# Patient Record
Sex: MALE | Race: WHITE | NOT HISPANIC OR LATINO | Employment: FULL TIME | URBAN - METROPOLITAN AREA
[De-identification: names, ages, dates, MRNs, and addresses within clinical notes are randomized per-mention and may not be internally consistent; named-entity substitution may affect disease eponyms.]

---

## 2021-12-01 ENCOUNTER — APPOINTMENT (OUTPATIENT)
Dept: RADIOLOGY | Facility: MEDICAL CENTER | Age: 31
End: 2021-12-01
Attending: EMERGENCY MEDICINE
Payer: COMMERCIAL

## 2021-12-01 ENCOUNTER — HOSPITAL ENCOUNTER (EMERGENCY)
Facility: MEDICAL CENTER | Age: 31
End: 2021-12-01
Attending: EMERGENCY MEDICINE
Payer: COMMERCIAL

## 2021-12-01 VITALS
TEMPERATURE: 98.2 F | DIASTOLIC BLOOD PRESSURE: 77 MMHG | WEIGHT: 285 LBS | BODY MASS INDEX: 35.43 KG/M2 | HEART RATE: 91 BPM | SYSTOLIC BLOOD PRESSURE: 138 MMHG | OXYGEN SATURATION: 97 % | RESPIRATION RATE: 19 BRPM | HEIGHT: 75 IN

## 2021-12-01 DIAGNOSIS — I48.3 TYPICAL ATRIAL FLUTTER (HCC): ICD-10-CM

## 2021-12-01 DIAGNOSIS — I26.99 ACUTE PULMONARY EMBOLISM, UNSPECIFIED PULMONARY EMBOLISM TYPE, UNSPECIFIED WHETHER ACUTE COR PULMONALE PRESENT (HCC): ICD-10-CM

## 2021-12-01 DIAGNOSIS — I26.94 MULTIPLE SUBSEGMENTAL PULMONARY EMBOLI WITHOUT ACUTE COR PULMONALE (HCC): ICD-10-CM

## 2021-12-01 PROBLEM — I48.92 ATRIAL FLUTTER (HCC): Status: ACTIVE | Noted: 2021-12-01

## 2021-12-01 PROBLEM — E66.9 OBESITY (BMI 30-39.9): Status: ACTIVE | Noted: 2021-12-01

## 2021-12-01 PROBLEM — R07.81 PLEURITIC CHEST PAIN: Status: ACTIVE | Noted: 2021-12-01

## 2021-12-01 LAB
ALBUMIN SERPL BCP-MCNC: 4.5 G/DL (ref 3.2–4.9)
ALBUMIN/GLOB SERPL: 1.6 G/DL
ALP SERPL-CCNC: 62 U/L (ref 30–99)
ALT SERPL-CCNC: 41 U/L (ref 2–50)
ANION GAP SERPL CALC-SCNC: 14 MMOL/L (ref 7–16)
AST SERPL-CCNC: 22 U/L (ref 12–45)
BASOPHILS # BLD AUTO: 0.3 % (ref 0–1.8)
BASOPHILS # BLD: 0.03 K/UL (ref 0–0.12)
BILIRUB SERPL-MCNC: 1.2 MG/DL (ref 0.1–1.5)
BUN SERPL-MCNC: 14 MG/DL (ref 8–22)
CALCIUM SERPL-MCNC: 9 MG/DL (ref 8.4–10.2)
CHLORIDE SERPL-SCNC: 98 MMOL/L (ref 96–112)
CO2 SERPL-SCNC: 24 MMOL/L (ref 20–33)
CREAT SERPL-MCNC: 1.2 MG/DL (ref 0.5–1.4)
D DIMER PPP IA.FEU-MCNC: 1.1 UG/ML (FEU) (ref 0–0.5)
EKG IMPRESSION: NORMAL
EOSINOPHIL # BLD AUTO: 0.04 K/UL (ref 0–0.51)
EOSINOPHIL NFR BLD: 0.4 % (ref 0–6.9)
ERYTHROCYTE [DISTWIDTH] IN BLOOD BY AUTOMATED COUNT: 37.8 FL (ref 35.9–50)
FLUAV RNA SPEC QL NAA+PROBE: NEGATIVE
FLUBV RNA SPEC QL NAA+PROBE: NEGATIVE
GLOBULIN SER CALC-MCNC: 2.9 G/DL (ref 1.9–3.5)
GLUCOSE SERPL-MCNC: 104 MG/DL (ref 65–99)
HCT VFR BLD AUTO: 49.4 % (ref 42–52)
HGB BLD-MCNC: 16.3 G/DL (ref 14–18)
IMM GRANULOCYTES # BLD AUTO: 0.05 K/UL (ref 0–0.11)
IMM GRANULOCYTES NFR BLD AUTO: 0.5 % (ref 0–0.9)
LIPASE SERPL-CCNC: 26 U/L (ref 7–58)
LYMPHOCYTES # BLD AUTO: 1.75 K/UL (ref 1–4.8)
LYMPHOCYTES NFR BLD: 18.8 % (ref 22–41)
MCH RBC QN AUTO: 28.3 PG (ref 27–33)
MCHC RBC AUTO-ENTMCNC: 33 G/DL (ref 33.7–35.3)
MCV RBC AUTO: 85.8 FL (ref 81.4–97.8)
MONOCYTES # BLD AUTO: 0.84 K/UL (ref 0–0.85)
MONOCYTES NFR BLD AUTO: 9 % (ref 0–13.4)
NEUTROPHILS # BLD AUTO: 6.58 K/UL (ref 1.82–7.42)
NEUTROPHILS NFR BLD: 71 % (ref 44–72)
NRBC # BLD AUTO: 0 K/UL
NRBC BLD-RTO: 0 /100 WBC
PLATELET # BLD AUTO: 267 K/UL (ref 164–446)
PMV BLD AUTO: 10 FL (ref 9–12.9)
POTASSIUM SERPL-SCNC: 4.1 MMOL/L (ref 3.6–5.5)
PROT SERPL-MCNC: 7.4 G/DL (ref 6–8.2)
RBC # BLD AUTO: 5.76 M/UL (ref 4.7–6.1)
RSV RNA SPEC QL NAA+PROBE: NEGATIVE
SARS-COV-2 RNA RESP QL NAA+PROBE: NOTDETECTED
SODIUM SERPL-SCNC: 136 MMOL/L (ref 135–145)
SPECIMEN SOURCE: NORMAL
TROPONIN T SERPL-MCNC: <6 NG/L (ref 6–19)
WBC # BLD AUTO: 9.3 K/UL (ref 4.8–10.8)

## 2021-12-01 PROCEDURE — 99285 EMERGENCY DEPT VISIT HI MDM: CPT

## 2021-12-01 PROCEDURE — 36415 COLL VENOUS BLD VENIPUNCTURE: CPT

## 2021-12-01 PROCEDURE — A9270 NON-COVERED ITEM OR SERVICE: HCPCS | Performed by: EMERGENCY MEDICINE

## 2021-12-01 PROCEDURE — 71045 X-RAY EXAM CHEST 1 VIEW: CPT

## 2021-12-01 PROCEDURE — 71275 CT ANGIOGRAPHY CHEST: CPT

## 2021-12-01 PROCEDURE — 85025 COMPLETE CBC W/AUTO DIFF WBC: CPT

## 2021-12-01 PROCEDURE — 700102 HCHG RX REV CODE 250 W/ 637 OVERRIDE(OP): Performed by: EMERGENCY MEDICINE

## 2021-12-01 PROCEDURE — 93005 ELECTROCARDIOGRAM TRACING: CPT

## 2021-12-01 PROCEDURE — 93005 ELECTROCARDIOGRAM TRACING: CPT | Performed by: EMERGENCY MEDICINE

## 2021-12-01 PROCEDURE — 96374 THER/PROPH/DIAG INJ IV PUSH: CPT

## 2021-12-01 PROCEDURE — 700111 HCHG RX REV CODE 636 W/ 250 OVERRIDE (IP): Performed by: EMERGENCY MEDICINE

## 2021-12-01 PROCEDURE — 99284 EMERGENCY DEPT VISIT MOD MDM: CPT | Performed by: STUDENT IN AN ORGANIZED HEALTH CARE EDUCATION/TRAINING PROGRAM

## 2021-12-01 PROCEDURE — 83690 ASSAY OF LIPASE: CPT

## 2021-12-01 PROCEDURE — 0241U HCHG SARS-COV-2 COVID-19 NFCT DS RESP RNA 4 TRGT MIC: CPT

## 2021-12-01 PROCEDURE — 700117 HCHG RX CONTRAST REV CODE 255: Performed by: EMERGENCY MEDICINE

## 2021-12-01 PROCEDURE — 85379 FIBRIN DEGRADATION QUANT: CPT

## 2021-12-01 PROCEDURE — 84484 ASSAY OF TROPONIN QUANT: CPT

## 2021-12-01 PROCEDURE — 96375 TX/PRO/DX INJ NEW DRUG ADDON: CPT

## 2021-12-01 PROCEDURE — 80053 COMPREHEN METABOLIC PANEL: CPT

## 2021-12-01 RX ORDER — ONDANSETRON 2 MG/ML
4 INJECTION INTRAMUSCULAR; INTRAVENOUS ONCE
Status: COMPLETED | OUTPATIENT
Start: 2021-12-01 | End: 2021-12-01

## 2021-12-01 RX ORDER — MORPHINE SULFATE 4 MG/ML
4 INJECTION INTRAVENOUS ONCE
Status: COMPLETED | OUTPATIENT
Start: 2021-12-01 | End: 2021-12-01

## 2021-12-01 RX ADMIN — APIXABAN 10 MG: 5 TABLET, FILM COATED ORAL at 20:04

## 2021-12-01 RX ADMIN — ONDANSETRON 4 MG: 2 INJECTION INTRAMUSCULAR; INTRAVENOUS at 15:07

## 2021-12-01 RX ADMIN — LIDOCAINE HYDROCHLORIDE 15 ML: 20 SOLUTION OROPHARYNGEAL at 15:07

## 2021-12-01 RX ADMIN — IOHEXOL 75 ML: 350 INJECTION, SOLUTION INTRAVENOUS at 18:28

## 2021-12-01 RX ADMIN — MORPHINE SULFATE 4 MG: 4 INJECTION INTRAVENOUS at 15:08

## 2021-12-01 ASSESSMENT — ENCOUNTER SYMPTOMS
SORE THROAT: 0
DIZZINESS: 0
VOMITING: 0
CONSTIPATION: 0
COUGH: 1
HEADACHES: 0
DIARRHEA: 0
NAUSEA: 0
CHILLS: 0
SHORTNESS OF BREATH: 1
FEVER: 0
MYALGIAS: 1
DOUBLE VISION: 0
ABDOMINAL PAIN: 0
MEMORY LOSS: 0
PALPITATIONS: 0
WEAKNESS: 0
BLURRED VISION: 0
NERVOUS/ANXIOUS: 0

## 2021-12-01 ASSESSMENT — LIFESTYLE VARIABLES
DO YOU DRINK ALCOHOL: YES
HAVE YOU EVER FELT YOU SHOULD CUT DOWN ON YOUR DRINKING: NO

## 2021-12-01 NOTE — ED PROVIDER NOTES
"ED Provider Note    CHIEF COMPLAINT  Chief Complaint   Patient presents with   • Chest Pain     started about a week ago, \"pain got exponentially worse in the last 15 minutes while eating\"; mid chest, denies radiation, c/o SOB       HPI  Nigel Scruggs is a 31 y.o. male here for evaluation of chest pain.  He states he has had the left sided chest pain over the last week, but today it 'came back really strong.'  It is non radiating, and not associated with any sob or vomiting. The pt states it was worse after we started eating. The pt has no back pain, no neck pain, and has not taken anything pta for the same.  The pt is not reproducible to palpation. He denies any trauma.       ROS  See HPI for further details, o/w negative.     PAST MEDICAL HISTORY   no bleeding disorders     SOCIAL HISTORY  Social History     Tobacco Use   • Smoking status: Never Smoker   • Smokeless tobacco: Never Used   Substance and Sexual Activity   • Alcohol use: Yes   • Drug use: Never   • Sexual activity: Not on file       Family History  No bleeding disorders     SURGICAL HISTORY  patient denies any surgical history    CURRENT MEDICATIONS  Home Medications     Reviewed by Jazmine Suero (Pharmacy Tech) on 12/01/21 at 1506  Med List Status: Complete   Medication Last Dose Status   Ascorbic Acid (VITAMIN C PO) 11/30/2021 Active   diphenhydrAMINE-APAP, sleep, (TYLENOL PM EXTRA STRENGTH PO) 11/30/2021 Active                ALLERGIES  No Known Allergies    REVIEW OF SYSTEMS  See HPI for further details. Review of systems as above, otherwise all other systems are negative.     PHYSICAL EXAM  Constitutional: Well developed, well nourished. mild acute distress.  HEENT: Normocephalic, atraumatic. Posterior pharynx clear and moist.  Eyes:  EOMI. Normal sclera.  Neck: Supple, Full range of motion, nontender.  Chest/Pulmonary: clear to ausculation. Symmetrical expansion.   Cardio: Regular rate and rhythm with no murmur.   Abdomen: Soft, " nontender. No peritoneal signs. No guarding. No Musculoskeletal: No deformity, no edema, neurovascular intact.   Neuro: Clear speech, appropriate, cooperative, cranial nerves II-XII grossly intact.  Psych: anxious  mood and affect    PROCEDURES     MEDICAL RECORD  I have reviewed patient's medical record and pertinent results are listed.    COURSE & MEDICAL DECISION MAKING  I have reviewed any medical record information, laboratory studies and radiographic results as noted above.    Results for orders placed or performed during the hospital encounter of 12/01/21   CBC w/ Differential   Result Value Ref Range    WBC 9.3 4.8 - 10.8 K/uL    RBC 5.76 4.70 - 6.10 M/uL    Hemoglobin 16.3 14.0 - 18.0 g/dL    Hematocrit 49.4 42.0 - 52.0 %    MCV 85.8 81.4 - 97.8 fL    MCH 28.3 27.0 - 33.0 pg    MCHC 33.0 (L) 33.7 - 35.3 g/dL    RDW 37.8 35.9 - 50.0 fL    Platelet Count 267 164 - 446 K/uL    MPV 10.0 9.0 - 12.9 fL    Neutrophils-Polys 71.00 44.00 - 72.00 %    Lymphocytes 18.80 (L) 22.00 - 41.00 %    Monocytes 9.00 0.00 - 13.40 %    Eosinophils 0.40 0.00 - 6.90 %    Basophils 0.30 0.00 - 1.80 %    Immature Granulocytes 0.50 0.00 - 0.90 %    Nucleated RBC 0.00 /100 WBC    Neutrophils (Absolute) 6.58 1.82 - 7.42 K/uL    Lymphs (Absolute) 1.75 1.00 - 4.80 K/uL    Monos (Absolute) 0.84 0.00 - 0.85 K/uL    Eos (Absolute) 0.04 0.00 - 0.51 K/uL    Baso (Absolute) 0.03 0.00 - 0.12 K/uL    Immature Granulocytes (abs) 0.05 0.00 - 0.11 K/uL    NRBC (Absolute) 0.00 K/uL   Complete Metabolic Panel (CMP)   Result Value Ref Range    Sodium 136 135 - 145 mmol/L    Potassium 4.1 3.6 - 5.5 mmol/L    Chloride 98 96 - 112 mmol/L    Co2 24 20 - 33 mmol/L    Anion Gap 14.0 7.0 - 16.0    Glucose 104 (H) 65 - 99 mg/dL    Bun 14 8 - 22 mg/dL    Creatinine 1.20 0.50 - 1.40 mg/dL    Calcium 9.0 8.4 - 10.2 mg/dL    AST(SGOT) 22 12 - 45 U/L    ALT(SGPT) 41 2 - 50 U/L    Alkaline Phosphatase 62 30 - 99 U/L    Total Bilirubin 1.2 0.1 - 1.5 mg/dL    Albumin  4.5 3.2 - 4.9 g/dL    Total Protein 7.4 6.0 - 8.2 g/dL    Globulin 2.9 1.9 - 3.5 g/dL    A-G Ratio 1.6 g/dL   Troponin STAT   Result Value Ref Range    Troponin T <6 6 - 19 ng/L   Lipase   Result Value Ref Range    Lipase 26 7 - 58 U/L   D-DIMER   Result Value Ref Range    D-Dimer Screen 1.10 (H) 0.00 - 0.50 ug/mL (FEU)   ESTIMATED GFR   Result Value Ref Range    GFR If African American >60 >60 mL/min/1.73 m 2    GFR If Non African American >60 >60 mL/min/1.73 m 2   COV-2, FLU A/B, AND RSV BY PCR (2-4 HOURS CEPHEID): Collect NP swab in VTM    Specimen: Respirate   Result Value Ref Range    Influenza virus A RNA Negative Negative    Influenza virus B, PCR Negative Negative    RSV, PCR Negative Negative    SARS-CoV-2 by PCR NotDetected     SARS-CoV-2 Source NP Swab    EKG   Result Value Ref Range    Report       [facility] Emergency Dept.    Test Date:  2021  Pt Name:    HENRY FABIAN                 Department: EDSM  MRN:        [id number]                      Room:       [facility]  Gender:     Male                         Technician: [id number]  :        1990                   Requested By:ER TRIAGE PROTOCOL  Order #:    [id number]                    Reading MD:    Measurements  Intervals                                Axis  Rate:       93                           P:  MA:                                      QRS:        65  QRSD:       88                           T:          8  QT:         344  QTc:        428    Interpretive Statements  A-FLUTTER/FIBRILLATION W/ COMPLETE AV BLOCK  No previous ECG available for comparison       CT-CTA CHEST PULMONARY ARTERY W/ RECONS   Final Result      1.  Positive for bilateral pulmonary emboli.      2.  Peripheral groundglass opacities in the right lower lobe and lingula could be due to pulmonary infarct or perhaps Covid 19 pneumonia      3.  This was discussed with Physician: JEFF SHELBY at 6:35 PM.            DX-CHEST-PORTABLE (1 VIEW)    Final Result      Patchy left basilar opacity is worrisome for pneumonia. Recommend follow up to resolution        Ekg;  nsr 93. No st elevation, no st depression qtc 428.   No comparison.     HYDRATION: Based on the patient's presentation of Dehydration the patient was given IV fluids. IV Hydration was used because oral hydration was not adequate alone. Upon recheck following hydration, the patient was improved.    7:59 PM  Dr. Cardozo was consulted to admit the pt. After he spoke to the pt, they agreed to go home on Eliquis.  Dr. Cardozo will order the script and all necessary d/c protocols. I will give the pt a dose here, prior to leaving. The pt has  No current pain, and is 97% on room air.       If you have had any blood pressure issues while here in the emergency department, please see your doctor for a further evaluation or work up.    Differential diagnoses include but not limited to: mi, pe, ptx    This patient presents with PE .  At this time, I have counseled the patient/family regarding their medications, pain control, and follow up.  They will continue their medications, if any, as prescribed.  They will return immediately for any worsening symptoms and/or any other medical concerns.  They will see their doctor, or contact the doctor provided, in 1-2 days for follow up.       FINAL IMPRESSION  1. Multiple subsegmental pulmonary emboli without acute cor pulmonale (HCC)  apixaban (ELIQUIS) 5mg Tab   2. Acute pulmonary embolism, unspecified pulmonary embolism type, unspecified whether acute cor pulmonale present (HCC)         Electronically signed by: Benson Chawla D.O., 12/1/2021 3:39 PM

## 2021-12-01 NOTE — Clinical Note
Nigel Scruggs was seen and treated in our emergency department on 12/1/2021.  He may return to work on 12/07/2021.       If you have any questions or concerns, please don't hesitate to call.      Benson Chawla D.O.

## 2021-12-01 NOTE — ED NOTES
Med rec updated and complete  Allergies reviewed  Interviewed pt with girlfriend at bedside with permission from pt.  Pt reports no prescription medications.  Pt reports no antibiotics in the last 30 days.      No current facility-administered medications on file prior to encounter.     Current Outpatient Medications on File Prior to Encounter   Medication Sig Dispense Refill   • Ascorbic Acid (VITAMIN C PO) Take 1 Tablet by mouth every evening.     • diphenhydrAMINE-APAP, sleep, (TYLENOL PM EXTRA STRENGTH PO) Take 2 Tablets by mouth at bedtime as needed (For sleep and pain).

## 2021-12-01 NOTE — ED TRIAGE NOTES
"Chief Complaint   Patient presents with   • Chest Pain     started about a week ago, \"pain got exponentially worse in the last 15 minutes while eating\"; mid chest, denies radiation, c/o SOB     Pt taken to do EKG; Pt denies any cardiac history  "

## 2021-12-02 ENCOUNTER — OFFICE VISIT (OUTPATIENT)
Dept: CARDIOLOGY | Facility: MEDICAL CENTER | Age: 31
End: 2021-12-02
Payer: COMMERCIAL

## 2021-12-02 VITALS
DIASTOLIC BLOOD PRESSURE: 70 MMHG | RESPIRATION RATE: 16 BRPM | HEART RATE: 86 BPM | HEIGHT: 75 IN | WEIGHT: 305 LBS | SYSTOLIC BLOOD PRESSURE: 138 MMHG | OXYGEN SATURATION: 96 % | BODY MASS INDEX: 37.92 KG/M2

## 2021-12-02 DIAGNOSIS — R07.81 CHEST PAIN, PLEURITIC: ICD-10-CM

## 2021-12-02 DIAGNOSIS — R03.0 ELEVATED BP WITHOUT DIAGNOSIS OF HYPERTENSION: ICD-10-CM

## 2021-12-02 DIAGNOSIS — I26.99 PULMONARY EMBOLISM, OTHER, UNSPECIFIED CHRONICITY, UNSPECIFIED WHETHER ACUTE COR PULMONALE PRESENT (HCC): ICD-10-CM

## 2021-12-02 DIAGNOSIS — I48.91 ATRIAL FIBRILLATION, UNSPECIFIED TYPE (HCC): ICD-10-CM

## 2021-12-02 LAB — EKG IMPRESSION: NORMAL

## 2021-12-02 PROCEDURE — 99204 OFFICE O/P NEW MOD 45 MIN: CPT | Performed by: STUDENT IN AN ORGANIZED HEALTH CARE EDUCATION/TRAINING PROGRAM

## 2021-12-02 PROCEDURE — 93000 ELECTROCARDIOGRAM COMPLETE: CPT | Performed by: STUDENT IN AN ORGANIZED HEALTH CARE EDUCATION/TRAINING PROGRAM

## 2021-12-02 RX ORDER — TRAMADOL HYDROCHLORIDE 50 MG/1
50 TABLET ORAL EVERY 4 HOURS PRN
Qty: 25 TABLET | Refills: 0 | Status: SHIPPED | OUTPATIENT
Start: 2021-12-02 | End: 2021-12-06

## 2021-12-02 ASSESSMENT — ENCOUNTER SYMPTOMS
FEVER: 0
NAUSEA: 0
NEAR-SYNCOPE: 0
SYNCOPE: 0
SHORTNESS OF BREATH: 0
WHEEZING: 0
WEAKNESS: 0
IRREGULAR HEARTBEAT: 0
COUGH: 0
ABDOMINAL PAIN: 0
DIARRHEA: 0
DYSPNEA ON EXERTION: 0
FOCAL WEAKNESS: 0
DIZZINESS: 0
PALPITATIONS: 0
NIGHT SWEATS: 0
PND: 0
VOMITING: 0
ORTHOPNEA: 0

## 2021-12-02 ASSESSMENT — FIBROSIS 4 INDEX: FIB4 SCORE: 0.39

## 2021-12-02 NOTE — CONSULTS
Hospital Medicine Consultation    Date of Service  12/1/2021    Referring Physician  Benson Chawla D.O.    Consulting Physician  Bernabe Cardozo M.D.    Reason for Consultation  Bilateral segmental and subsegmental pulmonary emboli    History of Presenting Illness  31 y.o. male who presented 12/1/2021 with recurrent left-sided chest pain.  Patient is accompanied by his significant other, who assisted with providing some history.  This is a pleasant gentleman with no significant past medical history other than obesity BMI 35.6.  Patient reports that during Thanksgiving dinner he started experiencing right-sided chest pain which was worse with breathing and improved with shallow breathing.  He characterized the pain as a sharp sensation rating 8/10 without radiation at that time.  The pain lasted approximately 1.5 hours.  He reports he has been feeling tired for the past 10 days and also developed some shortness of breath in the past few days.  Three days ago, he developed recurrence of the chest pain now on the left side which also characterizes sharp worse with breathing improved with shallow breathing rating a 9/10 without radiation.  He has been experiencing some shortness of breath particularly with exertion.  He is not a smoker.  He does drink socially approximately 4 beers during outings on weekends.  Patient does report that he did receive 2 doses of the vaccine but due to an error, it was not known kind of vaccination first time.  Last vaccine he received was a Pfizer vaccine in September.  He reports a recent trip to [place] but no long airplane flights or long periods of immobilization.  No recent surgeries.    In the ER, patient was maintaining SPO2 of 97% on room air.  COVID-19 testing was negative.  He had a mildly elevated D-dimer 1.1.  CTA with PE protocol showed segmental and subsegmental bilateral pulmonary emboli without signs of right heart strain.  COVID-19 testing was negative.  Troponin T was  <6.      Review of Systems  Review of Systems   Constitutional: Positive for malaise/fatigue. Negative for chills and fever.   HENT: Negative for ear pain and sore throat.    Eyes: Negative for blurred vision and double vision.   Respiratory: Positive for cough and shortness of breath.    Cardiovascular: Negative for chest pain and palpitations.   Gastrointestinal: Negative for abdominal pain, constipation, diarrhea, nausea and vomiting.   Genitourinary: Negative for dysuria and frequency.   Musculoskeletal: Positive for myalgias. Negative for joint pain.   Skin: Negative for itching and rash.   Neurological: Negative for dizziness, weakness and headaches.   Psychiatric/Behavioral: Negative for memory loss. The patient is not nervous/anxious.        Past Medical History  Denies any past medical history.  Takes no medications.    Surgical History  Denies any prior surgeries.    Family History  family history includes No Known Problems in his father and mother.    Social History   reports that he has never smoked. He has never used smokeless tobacco. He reports current alcohol use. He reports that he does not use drugs.    Medications  Prior to Admission Medications   Prescriptions Last Dose Informant Patient Reported? Taking?   Ascorbic Acid (VITAMIN C PO) 11/30/2021 at 1900 Patient Yes Yes   Sig: Take 1 Tablet by mouth every evening.   diphenhydrAMINE-APAP, sleep, (TYLENOL PM EXTRA STRENGTH PO) 11/30/2021 at 2130 Patient Yes Yes   Sig: Take 2 Tablets by mouth at bedtime as needed (For sleep and pain).      Facility-Administered Medications: None       Allergies  No Known Allergies    Physical Exam  Temp:  [36.8 °C (98.2 °F)] 36.8 °C (98.2 °F)  Pulse:  [89-98] 89  Resp:  [18-24] 18  BP: (132-136)/(73-97) 133/86  SpO2:  [97 %-99 %] 99 %    Physical Exam  Vitals and nursing note reviewed.   Constitutional:       General: He is not in acute distress.     Appearance: Normal appearance. He is well-developed. He is not  diaphoretic.   HENT:      Head: Normocephalic and atraumatic.      Right Ear: External ear normal.      Left Ear: External ear normal.      Nose: Nose normal.      Mouth/Throat:      Pharynx: Oropharynx is clear. No oropharyngeal exudate or posterior oropharyngeal erythema.   Eyes:      General: No scleral icterus.        Right eye: No discharge.         Left eye: No discharge.      Conjunctiva/sclera: Conjunctivae normal.      Pupils: Pupils are equal, round, and reactive to light.   Neck:      Thyroid: No thyromegaly.      Vascular: No JVD.      Trachea: No tracheal deviation.   Cardiovascular:      Rate and Rhythm: Normal rate and regular rhythm.      Heart sounds: Normal heart sounds. No murmur heard.  No friction rub. No gallop.    Pulmonary:      Effort: Pulmonary effort is normal. No respiratory distress.      Breath sounds: Normal breath sounds. No stridor. No wheezing or rales.   Abdominal:      General: Bowel sounds are normal. There is no distension.      Palpations: Abdomen is soft. There is no mass.      Tenderness: There is no abdominal tenderness. There is no guarding or rebound.   Musculoskeletal:         General: No tenderness or deformity.      Cervical back: Neck supple. No tenderness.      Right lower leg: No edema.      Left lower leg: No edema.   Lymphadenopathy:      Cervical: No cervical adenopathy.   Skin:     General: Skin is warm and dry.      Coloration: Skin is not pale.      Findings: No erythema or rash.   Neurological:      Mental Status: He is alert and oriented to person, place, and time.      Sensory: No sensory deficit.      Motor: No weakness or abnormal muscle tone.   Psychiatric:         Behavior: Behavior normal.         Thought Content: Thought content normal.         Judgment: Judgment normal.         Fluids      Laboratory  Recent Labs     12/01/21  1458   WBC 9.3   RBC 5.76   HEMOGLOBIN 16.3   HEMATOCRIT 49.4   MCV 85.8   MCH 28.3   MCHC 33.0*   RDW 37.8   PLATELETCT 267    MPV 10.0     Recent Labs     12/01/21  1458   SODIUM 136   POTASSIUM 4.1   CHLORIDE 98   CO2 24   GLUCOSE 104*   BUN 14   CREATININE 1.20   CALCIUM 9.0                     Imaging  CT-CTA CHEST PULMONARY ARTERY W/ RECONS   Final Result      1.  Positive for bilateral pulmonary emboli.      2.  Peripheral groundglass opacities in the right lower lobe and lingula could be due to pulmonary infarct or perhaps Covid 19 pneumonia      3.  This was discussed with Physician: JEFF SHELBY at 6:35 PM.            DX-CHEST-PORTABLE (1 VIEW)   Final Result      Patchy left basilar opacity is worrisome for pneumonia. Recommend follow up to resolution          I have personally reviewed the patient's CTA with PE protocol.  Per my read there are filling defects in the pulmonary arterial segments of the lower lobes bilaterally.  There is edema in left lingula concerning for a pulmonary infarction.        EKG per my read shows possible atrial flutter with 3-1 conduction heart rate of 93, QTc 428, no significant ST elevation or depression.  The atrial flutter is more prominent in leads II, III, aVR, and aVF.  Atrial flutter is less noticeable in the anterior lateral leads V2-V6.    Assessment/Plan  * Bilateral pulmonary embolism (HCC)- (present on admission)  Assessment & Plan  As per CT scan.  Patient is maintaining SPO2 of 97% on room air.  PESI Score of 40 points, class I, very low risk: 0-1.6% 30 day mortality in this group.    Question whether this may be related to COVID-19 vaccine versus new onset atrial flutter. Otherwise, may be unprovoked.  I discussed and reviewed the findings of the CT scan with the patient as well as his significant other.  I recommended minimum 3 months of anticoagulation and then reevaluation for longer need following outpatient follow-up with PCP and discussing continued risk versus benefits.  I discussed the risks and benefits of rivaroxaban, apixaban, and warfarin including intracranial and GI  bleed.  Patient agreed to proceed with apixaban given its lowest GI bleeding risk.    I discussed the patient's case with Dr. Chawla in the ER physician, and given that the patient is on room air, felt that the patient could be discharged safely discharged with a prescription of apixaban, which I have placed to [facility].  And also follow-up anticoagulation clinic.  Patient and significant other has acknowledged that the patient will find a PCP within 1 to 2 weeks for follow-up.    Atrial flutter (HCC)- (present on admission)  Assessment & Plan  Currently rate controlled and per EKG appears to be coming from the right side of the heart.  Unclear etiology at this time but concern for possible untreated obstructive apnea given his body habitus vs due to mild right heart strain in setting of new bilateral pulmonary emboli.  It is conceivable that right sided atrial flutter could be a source of patient's segmental and subsegmental pulmonary emboli.  Unfortunately, this finding was not acknowledged until the patient was discharged from the ER.      [id number] score of 0, 0.2% stroke risk per year.    I have requested scheduling for outpatient cardiology follow up.  Patient will proceed with full anticoagulation with apixaban for his pulmonary emboli.    Addendum 12/2/2021 11:08 AM:  I spoke with the patient about the findings of atrial flutter and significance as it related to his diagnosis of pulmonary embolism as outlined above.  He has an appointment with cardiology (Dr. Beal) for today at 12:45 PM.    Pleuritic chest pain- (present on admission)  Assessment & Plan  Secondary to pulmonary embolism.  Recommended Tylenol as needed.    Obesity (BMI 30-39.9)- (present on admission)  Assessment & Plan  BMI of 35.6 consistent with obesity.  There is concerned that the patient may have obstructive sleep apnea, which may be contributing to his atrial flutter.    I discussed with the patient healthy lifestyle changes including  regular exercise and healthy diet for directed weight loss goal.

## 2021-12-02 NOTE — ASSESSMENT & PLAN NOTE
BMI of 35.6 consistent with obesity.  There is concerned that the patient may have obstructive sleep apnea, which may be contributing to his atrial flutter.    I discussed with the patient healthy lifestyle changes including regular exercise and healthy diet for directed weight loss goal.

## 2021-12-02 NOTE — ASSESSMENT & PLAN NOTE
As per CT scan.  Patient is maintaining SPO2 of 97% on room air.  PESI Score of 40 points, class I, very low risk: 0-1.6% 30 day mortality in this group.    Question whether this may be related to COVID-19 vaccine versus new onset atrial flutter. Otherwise, may be unprovoked.  I discussed and reviewed the findings of the CT scan with the patient as well as his significant other.  I recommended minimum 3 months of anticoagulation and then reevaluation for longer need following outpatient follow-up with PCP and discussing continued risk versus benefits.  I discussed the risks and benefits of rivaroxaban, apixaban, and warfarin including intracranial and GI bleed.  Patient agreed to proceed with apixaban given its lowest GI bleeding risk.    I discussed the patient's case with Dr. Chawla in the ER physician, and given that the patient is on room air, felt that the patient could be discharged safely discharged with a prescription of apixaban, which I [facility].  And also follow-up anticoagulation clinic.  Patient and significant other has acknowledged that the patient will find a PCP within 1 to 2 weeks for follow-up.

## 2021-12-02 NOTE — ED NOTES
Pt provided with discharge paper work and follow up care. Pt declines questions. Pt ambulated out of Er.

## 2021-12-02 NOTE — ASSESSMENT & PLAN NOTE
Currently rate controlled and per EKG appears to be coming from the right side of the heart.  Unclear etiology at this time but concern for possible untreated obstructive apnea given his body habitus vs due to mild right heart strain in setting of new bilateral pulmonary emboli.  It is conceivable that right sided atrial flutter could be a source of patient's segmental and subsegmental pulmonary emboli.  Unfortunately, this finding was not acknowledged until the patient was discharged from the ER.      [id number] score of 0, 0.2% stroke risk per year.    I have requested scheduling for outpatient cardiology follow up.  Patient will proceed with full anticoagulation with apixaban for his pulmonary emboli.    Addendum 12/2/2021 11:08 AM:  I spoke with the patient about the findings of atrial flutter and significance as it related to his diagnosis of pulmonary embolism as outlined above.  He has an appointment with cardiology (Dr. Beal) for today at 12:45 PM.

## 2021-12-02 NOTE — PROGRESS NOTES
Cardiology Initial Consultation Note    Date of note:    12/2/2021    Primary Care Provider: Pcp Pt States None  Referring Provider: Bernabe Cardozo M.D.    Patient Name: Nigel Scruggs     YOB: 1990  MRN:              [id number]    Chief Complaint: ED chest pain and new onset A. fib    History of Present Illness: Mr. Nigel Scruggs is a 31 y.o. male with no prior medical history who is here for cardiac consultation for ED chest pain and new onset A. fib.    The patient was recently seen in the ER on 12/1/2021 for chest pain.  On EKG, the patient was found to have A. fib, and the patient was found to have PE.  The patient was started on anticoagulation.    The patient presents today with his wife. He reports that he had COVID vaccine, second shot in September 2021. He reports that his first shot was either Kael [employer] Kael or Pfizer (the person vaccinating the patient was not sure what the patient got). The patient reports that he had an episode of sharp chest pain about a month ago. However, it went away, and he had a worsening sharp chest pain yesterday, which is why he went to the ER. The patient reports that his chest pain has improved significantly since yesterday, but it persists. It is worse when he lies down and with deep breathing. He denies any orthopnea, PND, or leg swelling. No palpitations. No syncope or presyncopal episodes.    Cardiovascular Risk Factors:  1. Smoking status: Never smoker  2. Type II Diabetes Mellitus: None/not recently checked  3. Hypertension: None  4. Dyslipidemia: None/not recently checked  5. Family history of early Coronary Artery Disease in a first degree relative (Male less than 55 years of age; Female less than 65 years of age): None  6.  Obesity and/or Metabolic Syndrome: BMI 38.12  7. Sedentary lifestyle: Active    Review of Systems   Constitutional: Negative for fever, malaise/fatigue and night sweats.   Cardiovascular: Negative for chest pain, dyspnea on  "exertion, irregular heartbeat, leg swelling, near-syncope, orthopnea, palpitations, paroxysmal nocturnal dyspnea and syncope.   Respiratory: Negative for cough, shortness of breath and wheezing.    Gastrointestinal: Negative for abdominal pain, diarrhea, nausea and vomiting.   Neurological: Negative for dizziness, focal weakness and weakness.       All other systems reviewed and are negative.         Current Outpatient Medications   Medication Sig Dispense Refill   • traMADol (ULTRAM) 50 MG Tab Take 1 Tablet by mouth every four hours as needed for up to 4 days. 25 Tablet 0   • Ascorbic Acid (VITAMIN C PO) Take 1 Tablet by mouth every evening.     • diphenhydrAMINE-APAP, sleep, (TYLENOL PM EXTRA STRENGTH PO) Take 2 Tablets by mouth at bedtime as needed (For sleep and pain).     • apixaban (ELIQUIS) 5mg Tab Take 1 Tablet by mouth 2 times a day. Take 2 tablets (10 mg) twice daily for 7 days then 1 tablet twice daily. 60 Tablet 1     No current facility-administered medications for this visit.         No Known Allergies    Physical Exam:  Ambulatory Vitals  /70 (BP Location: Right arm, Patient Position: Sitting, BP Cuff Size: Adult)   Pulse 86   Resp 16   Ht 1.905 m (6' 3\")   Wt (!) 138 kg (305 lb)   SpO2 96%    Oxygen Therapy:  Pulse Oximetry: 96 %  BP Readings from Last 4 Encounters:   12/02/21 138/70   12/01/21 138/77   04/25/16 118/78   11/19/15 116/76       Weight/BMI: Body mass index is 38.12 kg/m².  Wt Readings from Last 4 Encounters:   12/02/21 (!) 138 kg (305 lb)   12/01/21 (!) 129 kg (285 lb)   04/25/16 (!) 127 kg (280 lb)   11/19/15 122 kg (268 lb)       General: Well appearing and in no apparent distress  Eyes: nl conjunctiva, no icteric sclera  ENT: wearing a mask, normal external appearance of ears  Neck: no visible JVP,  no carotid bruits  Lungs: normal respiratory effort, CTAB  Heart: RRR, no murmurs, no rubs or gallops,  no edema bilateral lower extremities. No LV/RV heave on cardiac " palpatation. + bilateral radial pulses.  + bilateral dp pulses.   Abdomen: soft, non tender, non distended, no masses, normal bowel sounds.  No HSM.  Extremities/MSK: no clubbing, no cyanosis  Neurological: No focal sensory deficits  Psychiatric: Appropriate affect, A/O x 3, intact judgement and insight  Skin: Warm extremities      Lab Data Review:  No results found for: CHOLSTRLTOT, LDL, HDL, TRIGLYCERIDE    Lab Results   Component Value Date/Time    SODIUM 136 12/01/2021 02:58 PM    POTASSIUM 4.1 12/01/2021 02:58 PM    CHLORIDE 98 12/01/2021 02:58 PM    CO2 24 12/01/2021 02:58 PM    GLUCOSE 104 (H) 12/01/2021 02:58 PM    BUN 14 12/01/2021 02:58 PM    CREATININE 1.20 12/01/2021 02:58 PM     Lab Results   Component Value Date/Time    ALKPHOSPHAT 62 12/01/2021 02:58 PM    ASTSGOT 22 12/01/2021 02:58 PM    ALTSGPT 41 12/01/2021 02:58 PM    TBILIRUBIN 1.2 12/01/2021 02:58 PM      Lab Results   Component Value Date/Time    WBC 9.3 12/01/2021 02:58 PM     No results found for: HBA1C      Cardiac Imaging and Procedures Review:    EKG dated 12/1/2021: My personal interpretation is atrial fibrillation    EKG dated 12/2/2021: My personal interpretation is sinus rhythm    No prior echocardiogram    Assessment & Plan     1. Atrial fibrillation, unspecified type (HCC)  EKG - Clinic Performed    EC-ECHOCARDIOGRAM COMPLETE W/O CONT    Referral to Pulmonary and Sleep Medicine   2. Pulmonary embolism, other, unspecified chronicity, unspecified whether acute cor pulmonale present ([facility])  EKG - Clinic Performed    EC-ECHOCARDIOGRAM COMPLETE W/O CONT   3. Elevated BP without diagnosis of hypertension  EKG - Clinic Performed    EC-ECHOCARDIOGRAM COMPLETE W/O CONT   4. Chest pain, pleuritic  EKG - Clinic Performed    EC-ECHOCARDIOGRAM COMPLETE W/O CONT         Shared Medical Decision Making:    New onset A. Fib  Paroxysmal A. fib  [id number] 0 but with diagnosis of PE. Currently in sinus rhythm.   -Continue apixaban 5 mg twice  daily  -Obtain echocardiogram to assess LVEF and any structural abnormalities  -We will refer for sleep study    Elevated BP without diagnosis of hypertension  BP borderline elevated.  -Instructed the patient to check BP at home.  If remains elevated above 130/80 persistently, will need to discuss starting antihypertensives.    Chest pain, pleuritic  Pulmonary embolism  Likely secondary to PE. Unclear etiology of A. fib and PE. Likely secondary to COVID vaccine versus undiagnosed Covid infection. No personal or family history of clotting disorders.  -Continue apixaban  -Obtain echocardiogram as above  -If sharp pain persists and/or abnormal pericardium, consider treating for pericarditis with 3 months colchicine and 2 weeks of NSAID. Will hold off for now, given improved symptoms.  -Counseled the patient to hold off intensive exercise until pain improves.  -The patient to set up appointment with primary care physician for further clotting disorder work-up.      All of the patient's excellent questions were answered to the best of my knowledge and to his satisfaction.  It was a pleasure seeing Mr. Nigel Scruggs in my clinic today. Return in about 4 weeks (around 12/30/2021). Patient is aware to call the cardiology clinic with any questions or concerns.      Jayson Beal MD  [facility]  [place] [facility], [address]  [address] [address]  Phone: [phone]  Fax: [phone]

## 2021-12-03 ENCOUNTER — HOSPITAL ENCOUNTER (OUTPATIENT)
Dept: CARDIOLOGY | Facility: MEDICAL CENTER | Age: 31
End: 2021-12-03
Attending: STUDENT IN AN ORGANIZED HEALTH CARE EDUCATION/TRAINING PROGRAM
Payer: COMMERCIAL

## 2021-12-03 DIAGNOSIS — I26.99 PULMONARY EMBOLISM, OTHER, UNSPECIFIED CHRONICITY, UNSPECIFIED WHETHER ACUTE COR PULMONALE PRESENT (HCC): ICD-10-CM

## 2021-12-03 DIAGNOSIS — R03.0 ELEVATED BP WITHOUT DIAGNOSIS OF HYPERTENSION: ICD-10-CM

## 2021-12-03 DIAGNOSIS — I48.91 ATRIAL FIBRILLATION, UNSPECIFIED TYPE (HCC): ICD-10-CM

## 2021-12-03 DIAGNOSIS — R07.81 CHEST PAIN, PLEURITIC: ICD-10-CM

## 2021-12-03 PROCEDURE — 93306 TTE W/DOPPLER COMPLETE: CPT

## 2021-12-06 LAB
LV EJECT FRACT MOD 2C 99903: 72.74
LV EJECT FRACT MOD 4C 99902: 65.27
LV EJECT FRACT MOD BP 99901: 69.63

## 2021-12-06 PROCEDURE — 93306 TTE W/DOPPLER COMPLETE: CPT | Mod: 26 | Performed by: STUDENT IN AN ORGANIZED HEALTH CARE EDUCATION/TRAINING PROGRAM

## 2021-12-07 ENCOUNTER — TELEPHONE (OUTPATIENT)
Dept: CARDIOLOGY | Facility: MEDICAL CENTER | Age: 31
End: 2021-12-07

## 2021-12-07 NOTE — TELEPHONE ENCOUNTER
ECHOCARDIOGRAM COMPLETE W/O CONT  Message  Received: Yesterday  PEDRO LUIS Vivar R.N. Hi Kaitlin,   Could you let the patient know that his echo was normal?  If he is continuing to have pleuritic chest pain, we can start him on colchicine and NSAID, if he would like.  Thank you!   -HK        Left detailed message for patient on personal voicemail with results and HK's recommendations. Advised patient to call back with additional questions.

## 2022-02-15 ASSESSMENT — ENCOUNTER SYMPTOMS
ABDOMINAL PAIN: 0
PALPITATIONS: 0
SYNCOPE: 0
WHEEZING: 0
WEAKNESS: 0
DIZZINESS: 0
SHORTNESS OF BREATH: 0
COUGH: 0
ORTHOPNEA: 0
FEVER: 0
IRREGULAR HEARTBEAT: 0
DIARRHEA: 0
FOCAL WEAKNESS: 0
NEAR-SYNCOPE: 0
VOMITING: 0
NAUSEA: 0
PND: 0
DYSPNEA ON EXERTION: 0
NIGHT SWEATS: 0

## 2022-02-16 ENCOUNTER — OFFICE VISIT (OUTPATIENT)
Dept: CARDIOLOGY | Facility: MEDICAL CENTER | Age: 32
End: 2022-02-16
Payer: COMMERCIAL

## 2022-02-16 VITALS
HEART RATE: 76 BPM | SYSTOLIC BLOOD PRESSURE: 128 MMHG | BODY MASS INDEX: 37.8 KG/M2 | DIASTOLIC BLOOD PRESSURE: 80 MMHG | OXYGEN SATURATION: 96 % | WEIGHT: 304 LBS | HEIGHT: 75 IN | RESPIRATION RATE: 18 BRPM

## 2022-02-16 DIAGNOSIS — R07.81 CHEST PAIN, PLEURITIC: ICD-10-CM

## 2022-02-16 DIAGNOSIS — I48.0 PAROXYSMAL ATRIAL FIBRILLATION (HCC): ICD-10-CM

## 2022-02-16 DIAGNOSIS — I26.99 PULMONARY EMBOLISM, OTHER, UNSPECIFIED CHRONICITY, UNSPECIFIED WHETHER ACUTE COR PULMONALE PRESENT (HCC): ICD-10-CM

## 2022-02-16 PROCEDURE — 99214 OFFICE O/P EST MOD 30 MIN: CPT | Performed by: STUDENT IN AN ORGANIZED HEALTH CARE EDUCATION/TRAINING PROGRAM

## 2022-02-16 ASSESSMENT — FIBROSIS 4 INDEX: FIB4 SCORE: 0.39

## 2022-02-16 NOTE — PROGRESS NOTES
Cardiology Follow-up Consultation Note    Date of note:  02/16/22  Primary Care Provider: Pcp Pt States None    Patient Name: Nigel Scruggs     YOB: 1990  MRN:              [id number]    Chief Complaint: Follow up A. fib    History of Present Illness: Mr. Nigel Scruggs is a 31 y.o. male with no prior medical history who is here for follow up A. fib.    The patient was last seen in my clinic on 12/2/2021 after being seen in the ER on 12/1/2021, during which he was found to have A. Fib and PE.  The patient was started on anticoagulation.  The patient was ordered an echocardiogram which was normal. We discussed possible treatment for pericarditis but he was doing better, and as such, we held off.     Of note, the patient reports that he had COVID vaccine second shot in September 2021, and reportedly his first shot was either Kael [employer] Kael or Pfizer (the person vaccinating the patient was not sure what the patient got).     The patient returns today for follow-up.  The patient reports that he has not had any chest pain since 3 weeks after seeing me.  He denies any shortness of breath.  No orthopnea, PND, or leg swelling. No palpitations. No syncope or presyncopal episodes.    Cardiovascular Risk Factors:  1. Smoking status: Never smoker  2. Type II Diabetes Mellitus: None/not recently checked  3. Hypertension: None  4. Dyslipidemia: None/not recently checked  5. Family history of early Coronary Artery Disease in a first degree relative (Male less than 55 years of age; Female less than 65 years of age): None  6.  Obesity and/or Metabolic Syndrome: BMI 38.12  7. Sedentary lifestyle: Active    Review of Systems   Constitutional: Negative for fever, malaise/fatigue and night sweats.   Cardiovascular: Negative for chest pain, dyspnea on exertion, irregular heartbeat, leg swelling, near-syncope, orthopnea, palpitations, paroxysmal nocturnal dyspnea and syncope.   Respiratory: Negative for cough,  "shortness of breath and wheezing.    Gastrointestinal: Negative for abdominal pain, diarrhea, nausea and vomiting.   Neurological: Negative for dizziness, focal weakness and weakness.       All other systems reviewed and are negative.         Current Outpatient Medications   Medication Sig Dispense Refill   • Ascorbic Acid (VITAMIN C PO) Take 1 Tablet by mouth every evening.     • diphenhydrAMINE-APAP, sleep, (TYLENOL PM EXTRA STRENGTH PO) Take 2 Tablets by mouth at bedtime as needed (For sleep and pain).     • apixaban (ELIQUIS) 5mg Tab Take 1 Tablet by mouth 2 times a day. Take 2 tablets (10 mg) twice daily for 7 days then 1 tablet twice daily. 60 Tablet 1     No current facility-administered medications for this visit.         No Known Allergies    Physical Exam:  Ambulatory Vitals  /80 (BP Location: Left arm, Patient Position: Sitting, BP Cuff Size: Adult)   Pulse 76   Resp 18   Ht 1.905 m (6' 3\")   Wt (!) 138 kg (304 lb)   SpO2 96%    Oxygen Therapy:  Pulse Oximetry: 96 %  BP Readings from Last 4 Encounters:   02/16/22 128/80   12/02/21 138/70   12/01/21 138/77   04/25/16 118/78       Weight/BMI: Body mass index is 38 kg/m².  Wt Readings from Last 4 Encounters:   02/16/22 (!) 138 kg (304 lb)   12/02/21 (!) 138 kg (305 lb)   12/01/21 (!) 129 kg (285 lb)   04/25/16 (!) 127 kg (280 lb)       General: Well appearing and in no apparent distress  Eyes: nl conjunctiva, no icteric sclera  ENT: wearing a mask, normal external appearance of ears  Neck: no visible JVP,  no carotid bruits  Lungs: normal respiratory effort, CTAB  Heart: RRR, no murmurs, no rubs or gallops,  no edema bilateral lower extremities. No LV/RV heave on cardiac palpatation. + bilateral radial pulses.  + bilateral dp pulses.   Abdomen: soft, non tender, non distended, no masses, normal bowel sounds.  No HSM.  Extremities/MSK: no clubbing, no cyanosis  Neurological: No focal sensory deficits  Psychiatric: Appropriate affect, A/O x 3, " intact judgement and insight  Skin: Warm extremities      Lab Data Review:  No results found for: CHOLSTRLTOT, LDL, HDL, TRIGLYCERIDE    Lab Results   Component Value Date/Time    SODIUM 136 12/01/2021 02:58 PM    POTASSIUM 4.1 12/01/2021 02:58 PM    CHLORIDE 98 12/01/2021 02:58 PM    CO2 24 12/01/2021 02:58 PM    GLUCOSE 104 (H) 12/01/2021 02:58 PM    BUN 14 12/01/2021 02:58 PM    CREATININE 1.20 12/01/2021 02:58 PM     Lab Results   Component Value Date/Time    ALKPHOSPHAT 62 12/01/2021 02:58 PM    ASTSGOT 22 12/01/2021 02:58 PM    ALTSGPT 41 12/01/2021 02:58 PM    TBILIRUBIN 1.2 12/01/2021 02:58 PM      Lab Results   Component Value Date/Time    WBC 9.3 12/01/2021 02:58 PM     No results found for: HBA1C      Cardiac Imaging and Procedures Review:    EKG dated 12/1/2021: My personal interpretation is atrial fibrillation    EKG dated 12/2/2021: My personal interpretation is sinus rhythm    Echocardiogram 12/6/2021  CONCLUSIONS  Normal left ventricular systolic function. The left ventricular   ejection fraction is visually estimated to be 65%.   The right ventricle is normal in size and systolic function.  No significant valvular abnormalities.   No prior study is available for comparison.      Assessment & Plan     1. Paroxysmal atrial fibrillation (HCC)     2. Chest pain, pleuritic     3. Pulmonary embolism, other, unspecified chronicity, unspecified whether acute cor pulmonale present ([facility])           Shared Medical Decision Making:    Paroxysmal A. fib  [id number] 0 but with diagnosis of PE. Currently in sinus rhythm.  Echocardiogram showed normal LVEF without any structural abnormalities.  -Once the patient has completed  PE treatment, can discontinue apixaban.    Chest pain, pleuritic, resolved  Pulmonary embolism  Likely secondary to PE. Unclear etiology of A. fib and PE. Likely secondary to COVID vaccine versus undiagnosed Covid infection. No personal or family history of clotting disorders.   Echocardiogram showed normal LVEF without any structure abnormalities.    All of the patient's excellent questions were answered to the best of my knowledge and to his satisfaction.  It was a pleasure seeing Mr. Nigel Scruggs in my clinic today. Return in about 1 year (around 2/16/2023), or if symptoms worsen or fail to improve. Patient is aware to call the cardiology clinic with any questions or concerns.      Jayson Beal MD  [facility], [address]  [address] [address]  Phone: [phone]  Fax: [phone]